# Patient Record
Sex: FEMALE | Race: WHITE | Employment: FULL TIME | URBAN - METROPOLITAN AREA
[De-identification: names, ages, dates, MRNs, and addresses within clinical notes are randomized per-mention and may not be internally consistent; named-entity substitution may affect disease eponyms.]

---

## 2023-09-14 ENCOUNTER — OFFICE VISIT (OUTPATIENT)
Dept: PRIMARY CARE CLINIC | Age: 68
End: 2023-09-14
Payer: MEDICARE

## 2023-09-14 VITALS
BODY MASS INDEX: 28.17 KG/M2 | OXYGEN SATURATION: 98 % | TEMPERATURE: 98.5 F | WEIGHT: 154 LBS | HEART RATE: 96 BPM | DIASTOLIC BLOOD PRESSURE: 70 MMHG | SYSTOLIC BLOOD PRESSURE: 132 MMHG

## 2023-09-14 DIAGNOSIS — R05.1 ACUTE COUGH: Primary | ICD-10-CM

## 2023-09-14 PROCEDURE — G8427 DOCREV CUR MEDS BY ELIG CLIN: HCPCS | Performed by: FAMILY MEDICINE

## 2023-09-14 PROCEDURE — 1123F ACP DISCUSS/DSCN MKR DOCD: CPT | Performed by: FAMILY MEDICINE

## 2023-09-14 PROCEDURE — 3017F COLORECTAL CA SCREEN DOC REV: CPT | Performed by: FAMILY MEDICINE

## 2023-09-14 PROCEDURE — 99214 OFFICE O/P EST MOD 30 MIN: CPT | Performed by: FAMILY MEDICINE

## 2023-09-14 PROCEDURE — 1090F PRES/ABSN URINE INCON ASSESS: CPT | Performed by: FAMILY MEDICINE

## 2023-09-14 PROCEDURE — G8400 PT W/DXA NO RESULTS DOC: HCPCS | Performed by: FAMILY MEDICINE

## 2023-09-14 PROCEDURE — G8419 CALC BMI OUT NRM PARAM NOF/U: HCPCS | Performed by: FAMILY MEDICINE

## 2023-09-14 PROCEDURE — 1036F TOBACCO NON-USER: CPT | Performed by: FAMILY MEDICINE

## 2023-09-14 RX ORDER — CITALOPRAM 20 MG/1
TABLET ORAL
COMMUNITY

## 2023-09-14 RX ORDER — DOXYCYCLINE HYCLATE 100 MG
100 TABLET ORAL 2 TIMES DAILY
Qty: 20 TABLET | Refills: 0 | Status: SHIPPED | OUTPATIENT
Start: 2023-09-14 | End: 2023-09-24

## 2024-10-16 ENCOUNTER — ANESTHESIA (OUTPATIENT)
Dept: OPERATING ROOM | Age: 69
End: 2024-10-16
Payer: MEDICARE

## 2024-10-16 ENCOUNTER — ANESTHESIA EVENT (OUTPATIENT)
Dept: OPERATING ROOM | Age: 69
End: 2024-10-16
Payer: MEDICARE

## 2024-10-16 ENCOUNTER — HOSPITAL ENCOUNTER (OUTPATIENT)
Age: 69
Setting detail: OUTPATIENT SURGERY
Discharge: HOME | End: 2024-10-16
Attending: INTERNAL MEDICINE | Admitting: INTERNAL MEDICINE
Payer: MEDICARE

## 2024-10-16 VITALS
BODY MASS INDEX: 27.6 KG/M2 | TEMPERATURE: 97.5 F | RESPIRATION RATE: 14 BRPM | HEIGHT: 62 IN | DIASTOLIC BLOOD PRESSURE: 60 MMHG | HEART RATE: 80 BPM | SYSTOLIC BLOOD PRESSURE: 101 MMHG | WEIGHT: 150 LBS | OXYGEN SATURATION: 100 %

## 2024-10-16 DIAGNOSIS — K21.9 GASTROESOPHAGEAL REFLUX DISEASE, UNSPECIFIED WHETHER ESOPHAGITIS PRESENT: ICD-10-CM

## 2024-10-16 DIAGNOSIS — R10.9 ABDOMINAL PAIN, UNSPECIFIED ABDOMINAL LOCATION: ICD-10-CM

## 2024-10-16 DIAGNOSIS — R19.4 CHANGE IN BOWEL HABITS: ICD-10-CM

## 2024-10-16 PROCEDURE — 2500000003 HC RX 250 WO HCPCS: Performed by: NURSE ANESTHETIST, CERTIFIED REGISTERED

## 2024-10-16 PROCEDURE — 88342 IMHCHEM/IMCYTCHM 1ST ANTB: CPT

## 2024-10-16 PROCEDURE — 3609010600 HC COLONOSCOPY POLYPECTOMY SNARE/COLD BIOPSY: Performed by: INTERNAL MEDICINE

## 2024-10-16 PROCEDURE — 6360000002 HC RX W HCPCS: Performed by: NURSE ANESTHETIST, CERTIFIED REGISTERED

## 2024-10-16 PROCEDURE — 7100000010 HC PHASE II RECOVERY - FIRST 15 MIN: Performed by: INTERNAL MEDICINE

## 2024-10-16 PROCEDURE — 3700000000 HC ANESTHESIA ATTENDED CARE: Performed by: INTERNAL MEDICINE

## 2024-10-16 PROCEDURE — 3700000001 HC ADD 15 MINUTES (ANESTHESIA): Performed by: INTERNAL MEDICINE

## 2024-10-16 PROCEDURE — 3609012400 HC EGD TRANSORAL BIOPSY SINGLE/MULTIPLE: Performed by: INTERNAL MEDICINE

## 2024-10-16 PROCEDURE — 2709999900 HC NON-CHARGEABLE SUPPLY: Performed by: INTERNAL MEDICINE

## 2024-10-16 PROCEDURE — 2580000003 HC RX 258

## 2024-10-16 PROCEDURE — 7100000011 HC PHASE II RECOVERY - ADDTL 15 MIN: Performed by: INTERNAL MEDICINE

## 2024-10-16 PROCEDURE — 88305 TISSUE EXAM BY PATHOLOGIST: CPT

## 2024-10-16 RX ORDER — SODIUM CHLORIDE 0.9 % (FLUSH) 0.9 %
5-40 SYRINGE (ML) INJECTION PRN
Status: DISCONTINUED | OUTPATIENT
Start: 2024-10-16 | End: 2024-10-16 | Stop reason: HOSPADM

## 2024-10-16 RX ORDER — SODIUM CHLORIDE 9 MG/ML
INJECTION, SOLUTION INTRAVENOUS PRN
Status: DISCONTINUED | OUTPATIENT
Start: 2024-10-16 | End: 2024-10-16 | Stop reason: HOSPADM

## 2024-10-16 RX ORDER — OXYCODONE HYDROCHLORIDE 5 MG/1
5 TABLET ORAL
Status: DISCONTINUED | OUTPATIENT
Start: 2024-10-16 | End: 2024-10-16 | Stop reason: HOSPADM

## 2024-10-16 RX ORDER — NALOXONE HYDROCHLORIDE 0.4 MG/ML
INJECTION, SOLUTION INTRAMUSCULAR; INTRAVENOUS; SUBCUTANEOUS PRN
Status: DISCONTINUED | OUTPATIENT
Start: 2024-10-16 | End: 2024-10-16 | Stop reason: HOSPADM

## 2024-10-16 RX ORDER — HALOPERIDOL 5 MG/ML
1 INJECTION INTRAMUSCULAR
Status: DISCONTINUED | OUTPATIENT
Start: 2024-10-16 | End: 2024-10-16 | Stop reason: HOSPADM

## 2024-10-16 RX ORDER — SODIUM CHLORIDE 0.9 % (FLUSH) 0.9 %
5-40 SYRINGE (ML) INJECTION EVERY 12 HOURS SCHEDULED
Status: DISCONTINUED | OUTPATIENT
Start: 2024-10-16 | End: 2024-10-16 | Stop reason: HOSPADM

## 2024-10-16 RX ORDER — METOCLOPRAMIDE HYDROCHLORIDE 5 MG/ML
10 INJECTION INTRAMUSCULAR; INTRAVENOUS
Status: DISCONTINUED | OUTPATIENT
Start: 2024-10-16 | End: 2024-10-16 | Stop reason: HOSPADM

## 2024-10-16 RX ORDER — SODIUM CHLORIDE, SODIUM LACTATE, POTASSIUM CHLORIDE, CALCIUM CHLORIDE 600; 310; 30; 20 MG/100ML; MG/100ML; MG/100ML; MG/100ML
INJECTION, SOLUTION INTRAVENOUS CONTINUOUS
Status: DISCONTINUED | OUTPATIENT
Start: 2024-10-16 | End: 2024-10-16 | Stop reason: HOSPADM

## 2024-10-16 RX ORDER — FENTANYL CITRATE 50 UG/ML
25 INJECTION, SOLUTION INTRAMUSCULAR; INTRAVENOUS EVERY 5 MIN PRN
Status: DISCONTINUED | OUTPATIENT
Start: 2024-10-16 | End: 2024-10-16 | Stop reason: HOSPADM

## 2024-10-16 RX ORDER — HYDROMORPHONE HYDROCHLORIDE 1 MG/ML
0.5 INJECTION, SOLUTION INTRAMUSCULAR; INTRAVENOUS; SUBCUTANEOUS EVERY 5 MIN PRN
Status: DISCONTINUED | OUTPATIENT
Start: 2024-10-16 | End: 2024-10-16 | Stop reason: HOSPADM

## 2024-10-16 RX ORDER — SODIUM CHLORIDE 9 MG/ML
INJECTION, SOLUTION INTRAVENOUS CONTINUOUS
Status: DISCONTINUED | OUTPATIENT
Start: 2024-10-16 | End: 2024-10-16 | Stop reason: HOSPADM

## 2024-10-16 RX ORDER — PROPOFOL 10 MG/ML
INJECTION, EMULSION INTRAVENOUS
Status: DISCONTINUED | OUTPATIENT
Start: 2024-10-16 | End: 2024-10-16 | Stop reason: SDUPTHER

## 2024-10-16 RX ORDER — LIDOCAINE HYDROCHLORIDE 20 MG/ML
INJECTION, SOLUTION EPIDURAL; INFILTRATION; INTRACAUDAL; PERINEURAL
Status: DISCONTINUED | OUTPATIENT
Start: 2024-10-16 | End: 2024-10-16 | Stop reason: SDUPTHER

## 2024-10-16 RX ORDER — LIDOCAINE HYDROCHLORIDE 10 MG/ML
1 INJECTION, SOLUTION EPIDURAL; INFILTRATION; INTRACAUDAL; PERINEURAL
Status: DISCONTINUED | OUTPATIENT
Start: 2024-10-17 | End: 2024-10-16 | Stop reason: HOSPADM

## 2024-10-16 RX ADMIN — LIDOCAINE HYDROCHLORIDE 60 MG: 20 INJECTION, SOLUTION EPIDURAL; INFILTRATION; INTRACAUDAL; PERINEURAL at 07:38

## 2024-10-16 RX ADMIN — SODIUM CHLORIDE: 9 INJECTION, SOLUTION INTRAVENOUS at 06:44

## 2024-10-16 RX ADMIN — PROPOFOL 150 MCG/KG/MIN: 10 INJECTION, EMULSION INTRAVENOUS at 07:38

## 2024-10-16 RX ADMIN — PROPOFOL 150 MCG/KG/MIN: 10 INJECTION, EMULSION INTRAVENOUS at 07:54

## 2024-10-16 ASSESSMENT — PAIN - FUNCTIONAL ASSESSMENT: PAIN_FUNCTIONAL_ASSESSMENT: 0-10

## 2024-10-16 NOTE — H&P
Interval H&P Note    Pt Name: Ceci Vang  MRN: [id number]  YOB: 1955  Date of evaluation: 10/16/2024      [x] I have reviewed the hardcopy Progress note by Maren SOSA dated 10/9/24 labeled in short chart for the Interval History and Physical note.     [x] I have examined  Ceci Vang, a 68 y.o. female who presents for her scheduled COLONOSCOPY DIAGNOSTIC, ESOPHAGOGASTRODUODENOSCOPY by Hong Alvarado MD for Change in bowel habits; Gastroesophageal reflux disease, unspecified wheth*.  Patient arrived for her colonoscopy and followed bowel prep until watery clear.  Previous colonoscopy Last \"2022 with polyps\" .  No FH colon cancer or polyps.  Patient has bowel changes loose to hard stools but denies bloody tarry stools, severe abdominal pain or unintentional weight loss. The patient denies new health changes, fever, chills, wheezing, cough, increased SOB, chest pain, open sores or wounds.    PMH, Surgical History, Social History, Psych, and Family History reviewed and updated in EPIC in appropriate section.   Allergies:  Patient has no known allergies.    Medications:    Prior to Admission medications    Not on File     Vital signs: /72   Pulse 88   Temp 97.5 °F (36.4 °C) (Temporal)   Resp 14   Ht 1.575 m (5' 2\")   Wt 68 kg (150 lb)   SpO2 95%   BMI 27.44 kg/m²       This is a 68 y.o. female who is pleasant, cooperative, alert and oriented x3, in no acute distress.    Heart: Heart sounds are normal.  Apical HR 88 regular rate and rhythm without murmur, gallop or rub.   Lungs: SpO2 95% Normal respiratory effort with equal expansion, mildly diminished breath sounds, unlabored at rest w/o use of accessory muscles or wheezes bilaterally   Abdomen: soft, nontender, nondistended with bowel sounds     Labs:  No results for input(s): \"HGB\", \"HCT\", \"WBC\", \"MCV\", \"PLT\", \"NA\", \"K\", \"CL\", \"CO2\", \"BUN\", \"CREATININE\", \"GLUCOSE\", \"INR\", \"PROTIME\", \"APTT\", \"AST\", \"ALT\", \"LABALBU\", \"HCG\" in the

## 2024-10-16 NOTE — OP NOTE
Operative Note      Patient: Ceci Vang  YOB: 1955  MRN: [id number]    Date of Procedure: 10/16/2024    Pre-Op Diagnosis: Change in bowel habits [R19.4]  Gastroesophageal reflux disease, unspecified whether esophagitis present [K21.9]  Abdominal pain, unspecified abdominal location [R10.9]    Post-Op Diagnosis: Colon polyps, sigmoid diverticulosis and internal hemorrhoids       Procedure(s):  ESOPHAGOGASTRODUODENOSCOPY with biopsies  COLONOSCOPY DIAGNOSTIC with polypectomies and biopsies    Surgeon(s):  Hong Alvarado MD    Assistant:   * No surgical staff found *    Informed consent: Risks, benefits, and alternatives of the procedure were explained to the patient prior to the procedure.  Risks include but not limited to bleeding, perforation, aspiration, allergic reaction to medication or death.  Patient was also informed about the risk of missing a lesion.       Anesthesia: Monitor Anesthesia Care    Estimated Blood Loss (mL): Minimal    Complications: None    Specimens:   ID Type Source Tests Collected by Time Destination   A : duodenal polyp biopsies Tissue Duodenum SURGICAL PATHOLOGY Hong Alvarado MD 10/16/2024 0737    B : gastric biopsies Tissue Stomach SURGICAL PATHOLOGY Hong Alvarado MD 10/16/2024 0737    C : distal esphagus biopsies Tissue Esophagus SURGICAL PATHOLOGY Hong Alvarado MD 10/16/2024 0739    D : proximal esophagus biopsies Tissue Esophagus SURGICAL PATHOLOGY Hong Alvarado MD 10/16/2024 0740    E : duodenal biopsies Tissue Duodenum SURGICAL PATHOLOGY Hong Alvarado MD 10/16/2024 0744    F : hepatic flexure polyp Tissue Colon SURGICAL PATHOLOGY Hong Alvarado MD 10/16/2024 0755    G : random colon biopses Tissue Colon SURGICAL PATHOLOGY Hong Alvarado MD 10/16/2024 0757    H : rectal polyp Tissue Colon SURGICAL PATHOLOGY Hong Alvarado MD 10/16/2024 0804        Implants:  * No implants in log *      Drains:   [REMOVED] Closed/Suction Drain Right;Anterior Abdomen Bulb (Removed)

## 2024-10-16 NOTE — ANESTHESIA PRE PROCEDURE
Department of Anesthesiology  Preprocedure Note       Name:  Ceci Vang   Age:  68 y.o.  :  1955                                          MRN:  [id number]         Date:  10/16/2024      Surgeon: Surgeon(s):  Hong Alvarado MD    Procedure: Procedure(s):  ESOPHAGOGASTRODUODENOSCOPY  COLONOSCOPY DIAGNOSTIC    Medications prior to admission:   Prior to Admission medications    Not on File       Current medications:    Current Facility-Administered Medications   Medication Dose Route Frequency Provider Last Rate Last Admin   • [START ON 10/17/2024] lidocaine PF 1 % injection 1 mL  1 mL IntraDERmal Once PRN Tram Blanco P, DO       • 0.9 % sodium chloride infusion   IntraVENous Continuous TramBlanco P,  mL/hr at 10/16/24 0644 New Bag at 10/16/24 0644   • lactated ringers IV soln infusion   IntraVENous Continuous Tram, Blanco P, DO       • sodium chloride flush 0.9 % injection 5-40 mL  5-40 mL IntraVENous 2 times per day Tram Blanco P, DO       • sodium chloride flush 0.9 % injection 5-40 mL  5-40 mL IntraVENous PRN Blanco Ayala P, DO       • 0.9 % sodium chloride infusion   IntraVENous PRN Tram Blanco P, DO           Allergies:  No Known Allergies    Problem List:    Patient Active Problem List   Diagnosis Code   • GERD (gastroesophageal reflux disease) K21.9   • Anxiety F41.9   • Encounter for cosmetic surgery Z41.1   • Encounter for cosmetic surgery Z41.1       Past Medical History:        Diagnosis Date   • H/O: hysterectomy     total   • Ovarian cyst     pt unsure what side, problem resolved   • S/P laparotomy    • Vaginal delivery     x3       Past Surgical History:        Procedure Laterality Date   • ARM SURGERY     • COLONOSCOPY     • COSMETIC SURGERY  2016    circumfrential abdominalplasty and bilat mastopexy   • HYSTERECTOMY (CERVIX STATUS UNKNOWN)      total   • HYSTERECTOMY (CERVIX STATUS UNKNOWN)     • OVARY REMOVAL     • SPINE SURGERY         Social History:    Social History

## 2024-10-16 NOTE — OP NOTE
Operative Note      Patient: Ceci Vang  YOB: 1955  MRN: [id number]    Date of Procedure: 10/16/2024    Pre-Op Diagnosis: Change in bowel habits [R19.4]  Gastroesophageal reflux disease, unspecified whether esophagitis present [K21.9]  Abdominal pain, unspecified abdominal location [R10.9]    Post-Op Diagnosis: Hiatal hernia, gastritis and duodenal polyp.       Procedure(s):  ESOPHAGOGASTRODUODENOSCOPY with biopsies  COLONOSCOPY DIAGNOSTIC with polypectomies and biopsies    Surgeon(s):  Hong Alvarado MD    Assistant:   * No surgical staff found *    Informed consent: Risks, benefits, and alternatives of the procedure were explained to the patient prior to the procedure.  Risks include but not limited to bleeding, perforation, aspiration, allergic reaction to medication or death.  Patient was also informed about the risk of missing a lesion.       Anesthesia: Monitor Anesthesia Care    Estimated Blood Loss (mL): Minimal    Complications: None    Specimens:   ID Type Source Tests Collected by Time Destination   A : duodenal polyp biopsies Tissue Duodenum SURGICAL PATHOLOGY Hong Alvarado MD 10/16/2024 0737    B : gastric biopsies Tissue Stomach SURGICAL PATHOLOGY Hong Alvarado MD 10/16/2024 0737    C : distal esphagus biopsies Tissue Esophagus SURGICAL PATHOLOGY Hong Alvarado MD 10/16/2024 0739    D : proximal esophagus biopsies Tissue Esophagus SURGICAL PATHOLOGY Hong Alvarado MD 10/16/2024 0740    E : duodenal biopsies Tissue Duodenum SURGICAL PATHOLOGY Hong Alvarado MD 10/16/2024 0744    F : hepatic flexure polyp Tissue Colon SURGICAL PATHOLOGY Hong Alvarado MD 10/16/2024 0755    G : random colon biopses Tissue Colon SURGICAL PATHOLOGY Hong Alvarado MD 10/16/2024 0757    H : rectal polyp Tissue Colon SURGICAL PATHOLOGY Hong Alvarado MD 10/16/2024 0804        Implants:  * No implants in log *      Drains:   [REMOVED] Closed/Suction Drain Right;Anterior Abdomen Bulb (Removed)       [REMOVED]

## 2024-10-16 NOTE — DISCHARGE INSTRUCTIONS
POST COLONOSCOPY & EGD INSTRUCTIONS    1. ACTIVITY   No driving, operating machinery, signing legal papers, or making important decisions for 24 hours   Resume normal activity after 24 hours   You may return to work after 24 hours.      2. DIET  _____  Diet modification: {YES / NO:19727}   .    Try to avoid spicy, greasy, or fried foods for your first meal after the procedure      3. MEDICATIONS   Do not consume alcohol, tranquilizers or sleeping medications for 24 hours unless otherwise advised by your physician.   Resume your usual medications unless otherwise instructed.      4. NORMAL CHANGES YOU MAY EXPERIENCE AFTER ENDOSCOPY:  From the Colonoscopy:   Passing gas for several hours is normal   Some mild abdominal cramping is normal   If a biopsy/polypectomy was done, you may see some spotting of blood   You may feel tired or worn out for the next 24-48 hours due to the prep and sedation  From the EGD:   A sore throat is normal   A bloated feeling and belching from air in the stomach is normal   If a biopsy was done, you may spit up some blood tinged mucus         5. CALL YOUR PHYSICIAN IF YOU EXPERIENCE ANY OF THE FOLLOWING   Vomiting blood or passing blood rectally (color may be red or black)   Severe abdominal pain or tenderness (that is not relieved by passing air)   Fever, chills, or excessive sweating   Persistent nausea or vomiting   Redness or swelling at the IV site      6. ADDITIONAL INSTRUCTIONS      IF YOU HAVE ANY QUESTIONS OR CONCERNS PLEASE CALL YOUR DOCTOR,  IF YOU FEEL YOU HAVE A MEDICAL EMERGENCY PLEASE DIAL 911

## 2024-10-16 NOTE — ANESTHESIA POSTPROCEDURE EVALUATION
Department of Anesthesiology  Postprocedure Note    Patient: Ceci Vang  MRN: [id number]  YOB: 1955  Date of evaluation: 10/16/2024    Procedure Summary       Date: 10/16/24 Room / Location: [address]    Anesthesia Start: 0730 Anesthesia Stop: 0817    Procedures:       ESOPHAGOGASTRODUODENOSCOPY with biopsies      COLONOSCOPY DIAGNOSTIC with polypectomies and biopsies Diagnosis:       Change in bowel habits      Gastroesophageal reflux disease, unspecified whether esophagitis present      Abdominal pain, unspecified abdominal location      (Change in bowel habits [R19.4])      (Gastroesophageal reflux disease, unspecified whether esophagitis present [K21.9])      (Abdominal pain, unspecified abdominal location [R10.9])    Surgeons: Hong Alvarado MD Responsible Provider: Trisha Oakley MD    Anesthesia Type: MAC ASA Status: 2            Anesthesia Type: No value filed.    Ruben Phase I:      Ruben Phase II: Ruben Score: 10    Anesthesia Post Evaluation    Patient location during evaluation: PACU  Patient participation: complete - patient participated  Level of consciousness: awake  Airway patency: patent  Nausea & Vomiting: no nausea  Cardiovascular status: blood pressure returned to baseline  Respiratory status: acceptable  Hydration status: euvolemic  Comments: Multimodal analgesia pain management as indicated by procedure  Multimodal analgesia pain management approach  Pain management: adequate    No notable events documented.

## 2024-10-21 LAB — SURGICAL PATHOLOGY REPORT: NORMAL

## 2025-06-02 ENCOUNTER — APPOINTMENT (OUTPATIENT)
Age: 70
End: 2025-06-02
Payer: MEDICARE

## 2025-06-03 ENCOUNTER — HOSPITAL ENCOUNTER (OUTPATIENT)
Age: 70
Setting detail: THERAPIES SERIES
Discharge: HOME | End: 2025-06-03
Payer: MEDICARE

## 2025-06-03 PROCEDURE — 97161 PT EVAL LOW COMPLEX 20 MIN: CPT

## 2025-06-03 PROCEDURE — 97110 THERAPEUTIC EXERCISES: CPT

## 2025-06-03 NOTE — CONSULTS
[x] [facility]  Outpatient Rehabilitation &  Therapy  [address]  P: [phone]  F: [phone]     Physical Therapy Lower Extremity Evaluation    Date:  6/3/2025  Patient: Ceci Vang  : 1955  MRN: [id number]  Physician: Blanco Grady MD   Insurance: [email or web address] Senior [employer] - NO PRIOR AUTH REQUIRED, BASED ON MEDICAL NECESSITY PER TALIA YR, FOLLOWS MEDICARE GUIDELINES, AET[employer] COVERS AS COINSURANCE TO PRIMARY   Medical Diagnosis: M25.569 (ICD-10-CM) - Knee pain, R knee medial meniscus tear  Rehab Codes: M25.569  Onset date: 2 weeks ago  Next 's appt.: TBD    Subjective:     CC: pt c/o intermittent R knee pain; pt states she also gets cortisone injections for L hip pain    HPI: (2 weeks ago) pt states her doctor wanted to do a R TKA, but pt had them do arthroscopy for a med meniscus tear instead    PMHx: [] Unremarkable [] Diabetes [] HTN  [] Pacemaker   [] MI/Heart Problems [] Cancer [] Arthritis [] Other:              [x] Refer to full medical chart  In EPIC       Comorbidities:   [] Obesity [] Dialysis  [] N/A   [] Asthma/COPD [] Dementia [] Other:   [] Stroke [] Sleep apnea [] Other:   [] Vascular disease [] Rheumatic disease [] Other:     Tests: [] X-Ray: [] MRI:  [] Other:    Medications: [x] Refer to full medical record [] None [] Other:  Allergies:      [x] Refer to full medical record [] None [] Other:    Function:  Hand Dominance  [] Right  [] Left  Patient lives with: spouse   In what type of home [x]  One story   [] Two story   [] Split level   Number of stairs to enter 7   With handrail on the [x]  Right to enter   [x] Left to enter   Bathroom has a []  Tub only  [] Tub/shower combo   [x] Walk in shower    []  Grab bars   Washing machine is on []  Main level   [] Second level   [x] Basement   Employer    Job Status []  Normal duty   [] Light duty   [] Off due to condition    []  Retired   [] Not employed   [] Disability  [] Other:  []  Return to

## 2025-06-04 ENCOUNTER — HOSPITAL ENCOUNTER (OUTPATIENT)
Age: 70
Setting detail: THERAPIES SERIES
Discharge: HOME | End: 2025-06-04
Payer: MEDICARE

## 2025-06-04 PROCEDURE — 97110 THERAPEUTIC EXERCISES: CPT

## 2025-06-04 NOTE — FLOWSHEET NOTE
[x] [facility]   Outpatient Rehabilitation & Therapy  [address]  P: [phone]  F: [phone]    Physical Therapy Daily Treatment Note      Date:  2025  Patient Name:  Ceci Vang    :  1955  MRN: [id number]  Physician: Blanco Grady MD                         Insurance: Medicare/[employer] - NO PRIOR AUTH REQUIRED, BASED ON MEDICAL NECESSITY PER TALIA YR, FOLLOWS MEDICARE GUIDELINES, AET[employer] COVERS AS COINSURANCE TO PRIMARY   Medical Diagnosis: M25.569 (ICD-10-CM) - Knee pain, R knee medial meniscus tear                 Rehab Codes: M25.569  Onset date: 2 weeks ago                  Next Dr's appt.: TBD  Visit# / total visits:   Cancels/No Shows: 0/0    Subjective:  25   Pain:  [] Yes  [x] No Location: med R knee  Pain Rating: (0-10 scale) 2-3/10  Pain altered Tx:  [x] No  [] Yes  Action:    Comments: pt noted increased med R knee pain later in the evening yesterday; she also had L groin pain    Objective:  Modalities:   Precautions: arthroscopic med menisectomy 2 weeks ago  Exercises:  Exercise Reps/ Time Weight/ Level Comments   4 way SLR 15 x       Heel raises 15 x       Nu step  S:6  5 mins  L:1     bike  S:1 x 5 mins       Step ups  15 x 6\"  R     Glut sets  10 x 5\"       Tandem glut sets  10 x 5\"   L bias     Sit-to-stand          L clam shell 20        Prone L quad flex 20\" x 3     Longitudinal traction L hip 3 mins                 Other:  HEP update as below:     1)  add L clam shell in R side lying 20 x daily  2) add prone L quad stretch for L hip/groin pain, 20\" x 3 daily  3) pt to ice R knee upon returning home (pt declined an offer of ice today in clinic) to prevent med R knee pain later in the evening       Specific Instructions for next treatment: advance strengthening exercises as appropriate         Assessment: [x] Progressing toward goals. 25  pt able to tolerate increased activity in clinic today w/o increased pain and

## 2025-06-10 ENCOUNTER — HOSPITAL ENCOUNTER (OUTPATIENT)
Age: 70
Setting detail: THERAPIES SERIES
Discharge: HOME | End: 2025-06-10
Payer: MEDICARE

## 2025-06-10 NOTE — FLOWSHEET NOTE
[facility] Outpatient Physical Therapy              [address] [place] [address]              Phone: [phone]              Fax: [phone]    Physical Therapy Cancel/No Show note    Date: 6/10/2025  Patient: Ceci Vang  : 1955  MRN: [id number]      Cancels/No Shows to date:     For today's appointment patient:    [x]  Cancelled    [] Rescheduled appointment    [] No-show     Reason given by patient:    [x]  Patient ill    []  Conflicting appointment    [] No transportation      [] Conflict with work    [] No reason given    [] Weather related    [] COVID-19    [] Other:      Comments:        [x] Next appointment was confirmed    Electronically signed by: Blanco Rodriguez PT

## 2025-07-01 ENCOUNTER — HOSPITAL ENCOUNTER (OUTPATIENT)
Age: 70
Setting detail: THERAPIES SERIES
Discharge: HOME | End: 2025-07-01
Payer: MEDICARE

## 2025-07-01 PROCEDURE — 97110 THERAPEUTIC EXERCISES: CPT

## 2025-07-01 NOTE — FLOWSHEET NOTE
[x] [facility]   Outpatient Rehabilitation & Therapy  [address]  P: [phone]  F: [phone]    Physical Therapy Daily Treatment Note      Date:  2025  Patient Name:  Ceci Vang    :  1955  MRN: [id number]  Physician: Blanco Grady MD                         Insurance: [email or web address] - NO PRIOR AUTH REQUIRED, BASED ON MEDICAL NECESSITY PER TALIA YR, FOLLOWS MEDICARE GUIDELINES, [employer] COVERS AS COINSURANCE TO PRIMARY   Medical Diagnosis: M25.569 (ICD-10-CM) - Knee pain, R knee medial meniscus tear                 Rehab Codes: M25.569  Onset date: 2 weeks ago                  Next Dr's appt.: TBD  Visit# / total visits: 3/20  Cancels/No Shows: 0/0    Subjective:  25   Pain:  [x] Yes  [] No Location: med R knee  Pain Rating: (0-10 scale) 3/10  Pain altered Tx:  [x] No  [] Yes  Action:    Comments: 25 she worked 4 days as a nurse and her R knee began hurting by the 3rd day.  Pt notes her R knee pain will increase after walking less than 10 mins    Objective:  Modalities:   Precautions: arthroscopic med menisectomy 25  Exercises:  Exercise Reps/ Time Weight/ Level Comments   4 way SLR 15 x 5\"       Heel raises 20 x       Nu step  S:6  5 mins  L:3     bike  S:1 x 5 mins       Step ups 20 x 4\" R     Tandem Glut sets R bias  20 x 5\"       Retro TM 2 mins      LAQ 10 x 5\"   10 x 5\"  10 x 5\" 0#  2#  4#    Sit-to-stand          L clam shell 20 x       Prone L quad flex 20\" x 3     Longitudinal traction L hip                  Other:  HEP update as below:     1)  add L clam shell in R side lying 20 x daily  2) add prone L quad stretch for L hip/groin pain, 20\" x 3 daily  3) pt to ice R knee upon returning home (pt declined an offer of ice today in clinic) to prevent med R knee pain later in the evening       Specific Instructions for next treatment: advance strengthening exercises as appropriate         Assessment: [x] Progressing toward

## 2025-07-08 ENCOUNTER — APPOINTMENT (OUTPATIENT)
Age: 70
End: 2025-07-08
Payer: MEDICARE

## 2025-07-11 ENCOUNTER — APPOINTMENT (OUTPATIENT)
Age: 70
End: 2025-07-11
Payer: MEDICARE

## (undated) DEVICE — STAZ ENDO KIT: Brand: MEDLINE INDUSTRIES, INC.

## (undated) DEVICE — TRAP SURG QUAD PARABOLA SLOT DSGN SFTY SCRN TRAPEASE

## (undated) DEVICE — BITEBLOCK ENDOSCP 60FR MAXI WHT POLYETH STURDY W/ VELC WVN

## (undated) DEVICE — GLOVE SURG 8 11.7IN BEAD CUF LIGHT BRN SENSICARE LTX FREE

## (undated) DEVICE — FORCEPS BX L240CM JAW DIA2.4MM ORNG L CAP W/ NDL DISP RAD

## (undated) DEVICE — SNARE ENDOSCP M L240CM LOOP W27MM SHTH DIA2.4MM OVL FLX